# Patient Record
Sex: MALE | Race: WHITE | Employment: OTHER | URBAN - METROPOLITAN AREA
[De-identification: names, ages, dates, MRNs, and addresses within clinical notes are randomized per-mention and may not be internally consistent; named-entity substitution may affect disease eponyms.]

---

## 2018-04-29 ENCOUNTER — HOSPITAL ENCOUNTER (EMERGENCY)
Age: 53
Discharge: HOME | End: 2018-04-29

## 2018-04-29 ENCOUNTER — APPOINTMENT (OUTPATIENT)
Dept: ULTRASOUND IMAGING | Age: 53
End: 2018-04-29

## 2018-04-29 ENCOUNTER — APPOINTMENT (OUTPATIENT)
Dept: CT IMAGING | Age: 53
End: 2018-04-29

## 2018-04-29 VITALS
DIASTOLIC BLOOD PRESSURE: 70 MMHG | HEART RATE: 72 BPM | WEIGHT: 225 LBS | RESPIRATION RATE: 18 BRPM | SYSTOLIC BLOOD PRESSURE: 125 MMHG | OXYGEN SATURATION: 99 % | TEMPERATURE: 98.7 F | BODY MASS INDEX: 32.21 KG/M2 | HEIGHT: 70 IN

## 2018-04-29 DIAGNOSIS — R10.11 COLICKY RUQ ABDOMINAL PAIN: Primary | ICD-10-CM

## 2018-04-29 LAB
ALBUMIN SERPL-MCNC: 4.5 G/DL (ref 3.5–5.2)
ALP BLD-CCNC: 60 U/L (ref 40–129)
ALT SERPL-CCNC: 35 U/L (ref 0–40)
ANION GAP SERPL CALCULATED.3IONS-SCNC: 13 MMOL/L (ref 7–16)
AST SERPL-CCNC: 21 U/L (ref 0–39)
BILIRUB SERPL-MCNC: 0.4 MG/DL (ref 0–1.2)
BILIRUBIN URINE: NEGATIVE
BLOOD, URINE: NEGATIVE
BUN BLDV-MCNC: 14 MG/DL (ref 6–20)
CALCIUM SERPL-MCNC: 9.4 MG/DL (ref 8.6–10.2)
CHLORIDE BLD-SCNC: 99 MMOL/L (ref 98–107)
CLARITY: CLEAR
CO2: 26 MMOL/L (ref 22–29)
COLOR: YELLOW
CREAT SERPL-MCNC: 1 MG/DL (ref 0.7–1.2)
GFR AFRICAN AMERICAN: >60
GFR NON-AFRICAN AMERICAN: >60 ML/MIN/1.73
GLUCOSE BLD-MCNC: 105 MG/DL (ref 74–109)
GLUCOSE URINE: NEGATIVE MG/DL
HCT VFR BLD CALC: 43 % (ref 37–54)
HEMOGLOBIN: 14.3 G/DL (ref 12.5–16.5)
KETONES, URINE: NEGATIVE MG/DL
LACTIC ACID: 1.1 MMOL/L (ref 0.5–2.2)
LEUKOCYTE ESTERASE, URINE: NEGATIVE
LIPASE: 27 U/L (ref 13–60)
MCH RBC QN AUTO: 29.4 PG (ref 26–35)
MCHC RBC AUTO-ENTMCNC: 33.3 % (ref 32–34.5)
MCV RBC AUTO: 88.3 FL (ref 80–99.9)
NITRITE, URINE: NEGATIVE
PDW BLD-RTO: 13.2 FL (ref 11.5–15)
PH UA: 5.5 (ref 5–9)
PLATELET # BLD: 244 E9/L (ref 130–450)
PMV BLD AUTO: 10.1 FL (ref 7–12)
POTASSIUM SERPL-SCNC: 4.4 MMOL/L (ref 3.5–5)
PROTEIN UA: NEGATIVE MG/DL
RBC # BLD: 4.87 E12/L (ref 3.8–5.8)
SODIUM BLD-SCNC: 138 MMOL/L (ref 132–146)
SPECIFIC GRAVITY UA: 1.01 (ref 1–1.03)
TOTAL PROTEIN: 7.4 G/DL (ref 6.4–8.3)
UROBILINOGEN, URINE: 0.2 E.U./DL
WBC # BLD: 10.1 E9/L (ref 4.5–11.5)

## 2018-04-29 PROCEDURE — 81003 URINALYSIS AUTO W/O SCOPE: CPT

## 2018-04-29 PROCEDURE — 83690 ASSAY OF LIPASE: CPT

## 2018-04-29 PROCEDURE — 76705 ECHO EXAM OF ABDOMEN: CPT

## 2018-04-29 PROCEDURE — 83605 ASSAY OF LACTIC ACID: CPT

## 2018-04-29 PROCEDURE — 36415 COLL VENOUS BLD VENIPUNCTURE: CPT

## 2018-04-29 PROCEDURE — 74176 CT ABD & PELVIS W/O CONTRAST: CPT

## 2018-04-29 PROCEDURE — 85027 COMPLETE CBC AUTOMATED: CPT

## 2018-04-29 PROCEDURE — 80053 COMPREHEN METABOLIC PANEL: CPT

## 2018-04-29 PROCEDURE — 99284 EMERGENCY DEPT VISIT MOD MDM: CPT

## 2018-04-29 RX ORDER — DICYCLOMINE HYDROCHLORIDE 10 MG/1
20 CAPSULE ORAL
Qty: 15 CAPSULE | Refills: 0 | Status: SHIPPED | OUTPATIENT
Start: 2018-04-29 | End: 2020-02-06

## 2018-04-29 ASSESSMENT — PAIN SCALES - GENERAL: PAINLEVEL_OUTOF10: 10

## 2018-04-29 ASSESSMENT — PAIN DESCRIPTION - LOCATION: LOCATION: ABDOMEN

## 2018-04-29 ASSESSMENT — PAIN DESCRIPTION - DESCRIPTORS: DESCRIPTORS: ACHING

## 2018-04-29 ASSESSMENT — PAIN DESCRIPTION - ORIENTATION: ORIENTATION: RIGHT;UPPER

## 2020-02-06 ENCOUNTER — HOSPITAL ENCOUNTER (OUTPATIENT)
Age: 55
Setting detail: OBSERVATION
Discharge: HOME | End: 2020-02-07
Attending: EMERGENCY MEDICINE | Admitting: INTERNAL MEDICINE
Payer: COMMERCIAL

## 2020-02-06 ENCOUNTER — APPOINTMENT (OUTPATIENT)
Dept: CT IMAGING | Age: 55
End: 2020-02-06
Payer: COMMERCIAL

## 2020-02-06 ENCOUNTER — APPOINTMENT (OUTPATIENT)
Dept: GENERAL RADIOLOGY | Age: 55
End: 2020-02-06
Payer: COMMERCIAL

## 2020-02-06 ENCOUNTER — APPOINTMENT (OUTPATIENT)
Dept: ULTRASOUND IMAGING | Age: 55
End: 2020-02-06
Payer: COMMERCIAL

## 2020-02-06 PROBLEM — R10.11 RIGHT UPPER QUADRANT ABDOMINAL PAIN: Status: ACTIVE | Noted: 2020-02-06

## 2020-02-06 LAB
ALBUMIN SERPL-MCNC: 4.1 G/DL (ref 3.5–5.2)
ALP BLD-CCNC: 63 U/L (ref 40–129)
ALT SERPL-CCNC: 25 U/L (ref 0–40)
ANION GAP SERPL CALCULATED.3IONS-SCNC: 8 MMOL/L (ref 7–16)
AST SERPL-CCNC: 20 U/L (ref 0–39)
BILIRUB SERPL-MCNC: 0.3 MG/DL (ref 0–1.2)
BILIRUBIN URINE: NEGATIVE
BLOOD, URINE: NEGATIVE
BUN BLDV-MCNC: 18 MG/DL (ref 6–20)
CALCIUM SERPL-MCNC: 9.1 MG/DL (ref 8.6–10.2)
CEA: 2.4 NG/ML (ref 0–5.2)
CHLORIDE BLD-SCNC: 101 MMOL/L (ref 98–107)
CLARITY: CLEAR
CO2: 25 MMOL/L (ref 22–29)
COLOR: YELLOW
CREAT SERPL-MCNC: 1.1 MG/DL (ref 0.7–1.2)
FOLATE: 8.7 NG/ML (ref 4.8–24.2)
GFR AFRICAN AMERICAN: >60
GFR NON-AFRICAN AMERICAN: >60 ML/MIN/1.73
GLUCOSE BLD-MCNC: 119 MG/DL (ref 74–99)
GLUCOSE URINE: NEGATIVE MG/DL
HBA1C MFR BLD: 6.4 % (ref 4–5.6)
HCT VFR BLD CALC: 44.7 % (ref 37–54)
HEMOGLOBIN: 15.2 G/DL (ref 12.5–16.5)
INR BLD: 0.9
KETONES, URINE: NEGATIVE MG/DL
LACTIC ACID, SEPSIS: 0.7 MMOL/L (ref 0.5–1.9)
LACTIC ACID: 0.9 MMOL/L (ref 0.5–2.2)
LEUKOCYTE ESTERASE, URINE: NEGATIVE
LIPASE: 27 U/L (ref 13–60)
MCH RBC QN AUTO: 30.4 PG (ref 26–35)
MCHC RBC AUTO-ENTMCNC: 34 % (ref 32–34.5)
MCV RBC AUTO: 89.4 FL (ref 80–99.9)
NITRITE, URINE: NEGATIVE
PDW BLD-RTO: 13.2 FL (ref 11.5–15)
PH UA: 5.5 (ref 5–9)
PLATELET # BLD: 291 E9/L (ref 130–450)
PMV BLD AUTO: 11.2 FL (ref 7–12)
POTASSIUM SERPL-SCNC: 4.1 MMOL/L (ref 3.5–5)
PRO-BNP: <5 PG/ML (ref 0–125)
PROCALCITONIN: 0.07 NG/ML (ref 0–0.08)
PROTEIN UA: NEGATIVE MG/DL
PROTHROMBIN TIME: 9.8 SEC (ref 9.3–12.4)
RBC # BLD: 5 E12/L (ref 3.8–5.8)
REASON FOR REJECTION: NORMAL
REJECTED TEST: NORMAL
SEDIMENTATION RATE, ERYTHROCYTE: 4 MM/HR (ref 0–15)
SODIUM BLD-SCNC: 134 MMOL/L (ref 132–146)
SPECIFIC GRAVITY UA: >=1.03 (ref 1–1.03)
TOTAL PROTEIN: 6.9 G/DL (ref 6.4–8.3)
TROPONIN: <0.01 NG/ML (ref 0–0.03)
UROBILINOGEN, URINE: 0.2 E.U./DL
VITAMIN B-12: 311 PG/ML (ref 211–946)
WBC # BLD: 8.5 E9/L (ref 4.5–11.5)

## 2020-02-06 PROCEDURE — 82378 CARCINOEMBRYONIC ANTIGEN: CPT

## 2020-02-06 PROCEDURE — 76705 ECHO EXAM OF ABDOMEN: CPT

## 2020-02-06 PROCEDURE — 36415 COLL VENOUS BLD VENIPUNCTURE: CPT

## 2020-02-06 PROCEDURE — 87088 URINE BACTERIA CULTURE: CPT

## 2020-02-06 PROCEDURE — 6370000000 HC RX 637 (ALT 250 FOR IP): Performed by: STUDENT IN AN ORGANIZED HEALTH CARE EDUCATION/TRAINING PROGRAM

## 2020-02-06 PROCEDURE — 85027 COMPLETE CBC AUTOMATED: CPT

## 2020-02-06 PROCEDURE — 84145 PROCALCITONIN (PCT): CPT

## 2020-02-06 PROCEDURE — 2580000003 HC RX 258: Performed by: INTERNAL MEDICINE

## 2020-02-06 PROCEDURE — 6370000000 HC RX 637 (ALT 250 FOR IP): Performed by: INTERNAL MEDICINE

## 2020-02-06 PROCEDURE — 83690 ASSAY OF LIPASE: CPT

## 2020-02-06 PROCEDURE — 83880 ASSAY OF NATRIURETIC PEPTIDE: CPT

## 2020-02-06 PROCEDURE — 71046 X-RAY EXAM CHEST 2 VIEWS: CPT

## 2020-02-06 PROCEDURE — 83605 ASSAY OF LACTIC ACID: CPT

## 2020-02-06 PROCEDURE — 82105 ALPHA-FETOPROTEIN SERUM: CPT

## 2020-02-06 PROCEDURE — 96375 TX/PRO/DX INJ NEW DRUG ADDON: CPT

## 2020-02-06 PROCEDURE — 6360000002 HC RX W HCPCS: Performed by: EMERGENCY MEDICINE

## 2020-02-06 PROCEDURE — 86301 IMMUNOASSAY TUMOR CA 19-9: CPT

## 2020-02-06 PROCEDURE — 82746 ASSAY OF FOLIC ACID SERUM: CPT

## 2020-02-06 PROCEDURE — 85610 PROTHROMBIN TIME: CPT

## 2020-02-06 PROCEDURE — G0378 HOSPITAL OBSERVATION PER HR: HCPCS

## 2020-02-06 PROCEDURE — 83036 HEMOGLOBIN GLYCOSYLATED A1C: CPT

## 2020-02-06 PROCEDURE — 80053 COMPREHEN METABOLIC PANEL: CPT

## 2020-02-06 PROCEDURE — 6360000002 HC RX W HCPCS: Performed by: INTERNAL MEDICINE

## 2020-02-06 PROCEDURE — 96374 THER/PROPH/DIAG INJ IV PUSH: CPT

## 2020-02-06 PROCEDURE — 74176 CT ABD & PELVIS W/O CONTRAST: CPT

## 2020-02-06 PROCEDURE — 96376 TX/PRO/DX INJ SAME DRUG ADON: CPT

## 2020-02-06 PROCEDURE — 99285 EMERGENCY DEPT VISIT HI MDM: CPT

## 2020-02-06 PROCEDURE — 84484 ASSAY OF TROPONIN QUANT: CPT

## 2020-02-06 PROCEDURE — 85651 RBC SED RATE NONAUTOMATED: CPT

## 2020-02-06 PROCEDURE — 87040 BLOOD CULTURE FOR BACTERIA: CPT

## 2020-02-06 PROCEDURE — 82607 VITAMIN B-12: CPT

## 2020-02-06 PROCEDURE — 81003 URINALYSIS AUTO W/O SCOPE: CPT

## 2020-02-06 RX ORDER — MORPHINE SULFATE 4 MG/ML
4 INJECTION, SOLUTION INTRAMUSCULAR; INTRAVENOUS ONCE
Status: COMPLETED | OUTPATIENT
Start: 2020-02-06 | End: 2020-02-06

## 2020-02-06 RX ORDER — SENNA PLUS 8.6 MG/1
1 TABLET ORAL NIGHTLY
Status: DISCONTINUED | OUTPATIENT
Start: 2020-02-06 | End: 2020-02-07 | Stop reason: HOSPADM

## 2020-02-06 RX ORDER — SIMETHICONE 80 MG
80 TABLET,CHEWABLE ORAL EVERY 6 HOURS PRN
COMMUNITY

## 2020-02-06 RX ORDER — KETOROLAC TROMETHAMINE 30 MG/ML
15 INJECTION, SOLUTION INTRAMUSCULAR; INTRAVENOUS ONCE
Status: COMPLETED | OUTPATIENT
Start: 2020-02-06 | End: 2020-02-06

## 2020-02-06 RX ORDER — IBUPROFEN 200 MG
200 TABLET ORAL EVERY 6 HOURS PRN
COMMUNITY

## 2020-02-06 RX ORDER — PANTOPRAZOLE SODIUM 40 MG/1
40 TABLET, DELAYED RELEASE ORAL
Status: DISCONTINUED | OUTPATIENT
Start: 2020-02-07 | End: 2020-02-07 | Stop reason: HOSPADM

## 2020-02-06 RX ORDER — ACETAMINOPHEN 325 MG/1
650 TABLET ORAL EVERY 4 HOURS PRN
Status: DISCONTINUED | OUTPATIENT
Start: 2020-02-06 | End: 2020-02-07 | Stop reason: HOSPADM

## 2020-02-06 RX ORDER — MORPHINE SULFATE 4 MG/ML
6 INJECTION, SOLUTION INTRAMUSCULAR; INTRAVENOUS ONCE
Status: COMPLETED | OUTPATIENT
Start: 2020-02-06 | End: 2020-02-06

## 2020-02-06 RX ORDER — SODIUM CHLORIDE 9 MG/ML
INJECTION, SOLUTION INTRAVENOUS CONTINUOUS
Status: DISCONTINUED | OUTPATIENT
Start: 2020-02-06 | End: 2020-02-07 | Stop reason: HOSPADM

## 2020-02-06 RX ORDER — DOCUSATE SODIUM 100 MG/1
200 CAPSULE, LIQUID FILLED ORAL 2 TIMES DAILY
Status: DISCONTINUED | OUTPATIENT
Start: 2020-02-06 | End: 2020-02-07 | Stop reason: HOSPADM

## 2020-02-06 RX ORDER — KETOROLAC TROMETHAMINE 30 MG/ML
15 INJECTION, SOLUTION INTRAMUSCULAR; INTRAVENOUS EVERY 6 HOURS PRN
Status: DISPENSED | OUTPATIENT
Start: 2020-02-06 | End: 2020-02-07

## 2020-02-06 RX ORDER — DICYCLOMINE HYDROCHLORIDE 10 MG/1
20 CAPSULE ORAL
Status: DISCONTINUED | OUTPATIENT
Start: 2020-02-06 | End: 2020-02-06

## 2020-02-06 RX ORDER — DOCUSATE SODIUM 100 MG/1
100 CAPSULE, LIQUID FILLED ORAL 2 TIMES DAILY
Status: DISCONTINUED | OUTPATIENT
Start: 2020-02-06 | End: 2020-02-06

## 2020-02-06 RX ORDER — HYDROCODONE BITARTRATE AND ACETAMINOPHEN 5; 325 MG/1; MG/1
1 TABLET ORAL EVERY 4 HOURS PRN
Status: DISCONTINUED | OUTPATIENT
Start: 2020-02-06 | End: 2020-02-07 | Stop reason: HOSPADM

## 2020-02-06 RX ADMIN — MAGNESIUM CITRATE 296 ML: 1.75 LIQUID ORAL at 15:17

## 2020-02-06 RX ADMIN — SODIUM CHLORIDE: 9 INJECTION, SOLUTION INTRAVENOUS at 13:58

## 2020-02-06 RX ADMIN — SENNOSIDES 8.6 MG: 8.6 TABLET, FILM COATED ORAL at 20:41

## 2020-02-06 RX ADMIN — DOCUSATE SODIUM 200 MG: 100 CAPSULE, LIQUID FILLED ORAL at 20:41

## 2020-02-06 RX ADMIN — HYDROCODONE BITARTRATE AND ACETAMINOPHEN 1 TABLET: 5; 325 TABLET ORAL at 20:41

## 2020-02-06 RX ADMIN — MORPHINE SULFATE 6 MG: 4 INJECTION, SOLUTION INTRAMUSCULAR; INTRAVENOUS at 11:52

## 2020-02-06 RX ADMIN — SODIUM CHLORIDE: 9 INJECTION, SOLUTION INTRAVENOUS at 23:11

## 2020-02-06 RX ADMIN — KETOROLAC TROMETHAMINE 15 MG: 30 INJECTION, SOLUTION INTRAMUSCULAR; INTRAVENOUS at 15:17

## 2020-02-06 RX ADMIN — MORPHINE SULFATE 4 MG: 4 INJECTION, SOLUTION INTRAMUSCULAR; INTRAVENOUS at 08:34

## 2020-02-06 RX ADMIN — HYDROCODONE BITARTRATE AND ACETAMINOPHEN 1 TABLET: 5; 325 TABLET ORAL at 16:20

## 2020-02-06 RX ADMIN — KETOROLAC TROMETHAMINE 15 MG: 30 INJECTION, SOLUTION INTRAMUSCULAR; INTRAVENOUS at 22:06

## 2020-02-06 RX ADMIN — KETOROLAC TROMETHAMINE 15 MG: 30 INJECTION, SOLUTION INTRAMUSCULAR; INTRAVENOUS at 10:36

## 2020-02-06 ASSESSMENT — PAIN DESCRIPTION - PROGRESSION
CLINICAL_PROGRESSION: NOT CHANGED
CLINICAL_PROGRESSION: NOT CHANGED

## 2020-02-06 ASSESSMENT — PAIN - FUNCTIONAL ASSESSMENT
PAIN_FUNCTIONAL_ASSESSMENT: ACTIVITIES ARE NOT PREVENTED
PAIN_FUNCTIONAL_ASSESSMENT: PREVENTS OR INTERFERES SOME ACTIVE ACTIVITIES AND ADLS

## 2020-02-06 ASSESSMENT — PAIN DESCRIPTION - ORIENTATION
ORIENTATION: RIGHT

## 2020-02-06 ASSESSMENT — PAIN SCALES - GENERAL
PAINLEVEL_OUTOF10: 9
PAINLEVEL_OUTOF10: 7
PAINLEVEL_OUTOF10: 5
PAINLEVEL_OUTOF10: 8
PAINLEVEL_OUTOF10: 6
PAINLEVEL_OUTOF10: 8
PAINLEVEL_OUTOF10: 7
PAINLEVEL_OUTOF10: 8
PAINLEVEL_OUTOF10: 7

## 2020-02-06 ASSESSMENT — PAIN DESCRIPTION - DESCRIPTORS
DESCRIPTORS: SHARP;THROBBING
DESCRIPTORS: CONSTANT;CRAMPING;THROBBING
DESCRIPTORS: DULL
DESCRIPTORS: CONSTANT;CRAMPING;THROBBING

## 2020-02-06 ASSESSMENT — PAIN DESCRIPTION - ONSET
ONSET: ON-GOING
ONSET: ON-GOING

## 2020-02-06 ASSESSMENT — PAIN DESCRIPTION - FREQUENCY
FREQUENCY: CONTINUOUS

## 2020-02-06 ASSESSMENT — PAIN DESCRIPTION - PAIN TYPE
TYPE: ACUTE PAIN

## 2020-02-06 ASSESSMENT — PAIN DESCRIPTION - LOCATION
LOCATION: ABDOMEN

## 2020-02-06 NOTE — ED NOTES
Admission nurse entered room just before this RN and woke pt up from sleeping, family at bedside. Pt evaluated for pain level.      Andrzej Rivero RN  02/06/20 [id number]

## 2020-02-06 NOTE — H&P
finding  frequently related to fatty infiltration. There is limited visualization of the pancreas with no clearly  pathologic findings present. Right kidney is only partially visualized but not clearly pathologic. No evidence of pleural effusion or ascites.      Impression:       Unremarkable biliary tract. The liver appears mildly  hyperechoic which is a nonspecific finding frequently related to fatty  infiltration. Chest x-ray in the ED was negative. Liver functions were all normal.  Glucose 119. Lactic acid 0.7 sodium 134 potassium 4.1 chloride 101 CO2 25 BUN 18 creatinine 1.1 calcium 9.1 protein 6.9 albumin 4.1.  --Patient denies any previous rheumatic fever scarlet fever polio diphtheria cancer diabetes hypertension  --Colonoscopy last year; previous umbilical hernia repair no other surgical procedures  --Patient continues smoking despite all warnings, half a pack a day began approximately age 12  --States he has had pneumonia twice in the past.  Denies any recurring shortness of breath bronchitis asthma  --Denies any previous chest pains heart murmurs cardiac problems  --Denies any previous kidney stones blood in the stool blood in the urine        Past Medical History:   Diagnosis Date    Abdominal pain, RUQ (right upper quadrant) 2010    Diverticulosis     H/O: pneumonia     X2         Past Surgical History:   Procedure Laterality Date    COLONOSCOPY  2019    Diverticulosis    HERNIA REPAIR  1912    Umbilical hernia repair       Medications Prior to Admission:    Medications Prior to Admission: simethicone (MYLICON) 80 MG chewable tablet, Take 80 mg by mouth every 6 hours as needed for Flatulence  ibuprofen (ADVIL;MOTRIN) 200 MG tablet, Take 200 mg by mouth every 6 hours as needed for Pain    Allergies:    Iodine    Social History:    reports that he has been smoking cigarettes. He started smoking about 40 years ago. He has a 20.00 pack-year smoking history.  He has never used smokeless tobacco. He reports current alcohol use of about 42.0 standard drinks of alcohol per week. He reports current drug use. Drug: Marijuana. Family History:   family history includes Breast Cancer in his mother; Cancer in his brother and sister; Diabetes in his brother; Max Sharp in his father; Mult Sclerosis in his sister. REVIEW OF SYSTEMS:  As above in the HPI, otherwise negative    PHYSICAL EXAM:    VS: /75   Pulse 71   Temp 97.7 °F (36.5 °C) (Oral)   Resp 18   Ht 5' 10.5\" (1.791 m)   Wt 237 lb (107.5 kg)   SpO2 95%   BMI 33.53 kg/m²     General appearance: Alert, Awake, Oriented times 3, no distress  Skin: Warm and dry ; no rashes  Head: Normocephalic. No masses, lesions or tenderness noted  Eyes: Conjunctivae pink, sclera white. PERRL,EOM-I  Ears: External ears normal  Nose/Sinuses: Nares normal. Septum midline. Mucosa normal. No drainage  Oropharynx: Oropharynx clear with no exudate seen  Neck: Supple. No jugular venous distension, lymphadenopathy or thyromegaly Trachea midline  Lungs: Clear to auscultation bilaterally. No rhonchi, crackles or wheezes  Heart: S1 S2  Regular rate and rhythm. No rub, murmur or gallop  Abdomen: Soft, equivocal tenderness right upper quadrant to palpation. BS normal. No masses, organomegaly; no rebound or guarding  Extremities: No edema, Peripheral pulses palpable  Musculoskeletal: Muscular strength appears intact. Neuro:  No focal motor defects ; II-XII grossly intact .  RIVERA equally  Breast: deferred  Rectal: deferred  Genitalia:  deferred    LABS:  CBC:   Lab Results   Component Value Date    WBC 8.5 02/06/2020    RBC 5.00 02/06/2020    HGB 15.2 02/06/2020    HCT 44.7 02/06/2020    MCV 89.4 02/06/2020    MCH 30.4 02/06/2020    MCHC 34.0 02/06/2020    RDW 13.2 02/06/2020     02/06/2020    MPV 11.2 02/06/2020     CBC with Differential:    Lab Results   Component Value Date    WBC 8.5 02/06/2020    RBC 5.00 02/06/2020    HGB 15.2 02/06/2020    HCT 44.7 02/06/2020 0.2 02/06/2020    BILIRUBINUR Negative 02/06/2020    BLOODU Negative 02/06/2020    GLUCOSEU Negative 02/06/2020     HgBA1c:  No results found for: LABA1C  FLP:  No results found for: TRIG, HDL, LDLCALC, LDLDIRECT, LABVLDL  TSH:  No results found for: TSH  VITAMIN B12: No components found for: B12  FOLATE:  No results found for: FOLATE  LIPASE:    Lab Results   Component Value Date    LIPASE 27 02/06/2020       RADIOLOGY:  US ABDOMEN LIMITED   Final Result   Unremarkable biliary tract. The liver appears mildly   hyperechoic which is a nonspecific finding frequently related to fatty   infiltration. XR CHEST STANDARD (2 VW)   Final Result   No acute cardiopulmonary findings. CT ABDOMEN PELVIS WO CONTRAST   Final Result   Normal appendix. Colonic diverticulosis. Small fat-containing right   inguinal hernia. ASSESSMENT:      Active Hospital Problems    Diagnosis    Right upper quadrant abdominal pain [R10.11]       PLAN:  Medications discussed with patient  GI prophylaxis  DVT prophylaxis  I am told the ED physician discussed the above with Dr. Rea Arizmendi; Dr. Rea Arizmendi preferred the patient be admitted for observation and surgical consult. ED then contacted surgical, Dr. Al Kaufman. I spoke with surgical resident after he examined the patient; he believes patient may simply have constipation difficulties.   Citrate of magnesia has been ordered as well as Colace  Patient and wife informed me he has not been taking Bentyl despite it being listed on his intake medications; it will be discontinued  Ketorolac for severe pain which he states he is experiencing now  Since last year his work-up including HIDA scan were all done when patient was asymptomatic, I will repeat the HIDA scan at this time since he is having \"symptoms\" that awakened him from sleep  Await surgical consult          Please note that over 50 minutes was spent in evaluating the patient, review of records and results, discussion with staff/family, etc.    Wiliam Anthony  DO  2:54 PM  2/6/2020    Voice recognition software use for dictation

## 2020-02-06 NOTE — CONSULTS
GENERAL SURGERY  CONSULT NOTE  2/6/2020    Physician Consulted: Dr. Joshua Morgan  Reason for Consult: RUQ abd pain      HPI  Susu Perkins is a 47 y.o. male who presents for evaluation of weeks of intermittent RUQ abd pain that has progressively worsened. The pain radiates to his right lower back. The pain is not related to diet or bowel function. He states he has had this problem for about 10 years and when the pain starts it is intermittent for weeks before it resolves. He has had multiple CTs, RUQ US, and HIDA that did not show any gallbladder pathology. He had a colonoscopy last year which showed pandiverticular disease. He denies any Hx of EGD. Past Medical History:   Diagnosis Date    Diverticulosis        Past Surgical History:   Procedure Laterality Date    HERNIA REPAIR         Medications Prior to Admission:    Prior to Admission medications    Medication Sig Start Date End Date Taking? Authorizing Provider   simethicone (MYLICON) 80 MG chewable tablet Take 80 mg by mouth every 6 hours as needed for Flatulence   Yes Historical Provider, MD   ibuprofen (ADVIL;MOTRIN) 200 MG tablet Take 200 mg by mouth every 6 hours as needed for Pain   Yes Historical Provider, MD       Allergies   Allergen Reactions    Iodine Rash       Family History   Problem Relation Age of Onset    Breast Cancer Mother    Constantine Cheatham Father     Mult Sclerosis Sister     Diabetes Brother     Cancer Brother         leukemia    Cancer Sister         skin       Social History     Tobacco Use    Smoking status: Current Some Day Smoker     Packs/day: 0.50     Types: Cigarettes     Start date: 2/6/1986    Smokeless tobacco: Never Used   Substance Use Topics    Alcohol use:  Yes     Alcohol/week: 42.0 standard drinks     Types: 42 Cans of beer per week     Comment: 6 beers nightly    Drug use: Yes     Types: Marijuana         Review of Systems   General ROS: fever (-), chills (-)  Respiratory ROS: SOB (-)  Cardiovascular ROS: CP (-)  Gastrointestinal ROS: Nausea (-), vomiting (-), diarrhea (-), constipation (-), melena (-), hematochezia (-)        PHYSICAL EXAM:    Vitals:    20 1327   BP: 121/75   Pulse: 71   Resp: 18   Temp: 97.7 °F (36.5 °C)   SpO2: 95%       General Appearance:  Alert, awake, cooperative, lying in bed   Lungs:  Normal work of breathing   Heart:  Regular rate   Abdomen:  Soft, no tenderness, non distended       LABS:  CBC  Recent Labs     20  0816   WBC 8.5   HGB 15.2   HCT 44.7        BMP  Recent Labs     20  1100      K 4.1      CO2 25   BUN 18   CREATININE 1.1   CALCIUM 9.1     Liver Function  Recent Labs     20  0816 20  1100   LIPASE 27  --    BILITOT  --  0.3   AST  --  20   ALT  --  25   ALKPHOS  --  63   PROT  --  6.9   LABALBU  --  4.1     No results for input(s): LACTATE in the last 72 hours. Recent Labs     20  1337   INR 0.9       RADIOLOGY  Ct Abdomen Pelvis Wo Contrast    Result Date: 2020  Patient MRN:  [id number] : 1965 Age: 47 years Gender: Male Order Date:  2020 8:15 AM EXAM: CT ABDOMEN PELVIS WO CONTRAST NUMBER OF IMAGES \ views:  403 INDICATION: Intermittent right lower quadrant abdominal pain x2 weeks. pain COMPARISON: 2018 TECHNIQUE: Axial computerized tomography sections of the abdomen and pelvis with sagittal and coronal MPR reconstructions were obtained from the top of the diaphragm to the pelvis. Low-dose CT  acquisition technique included one of following options; 1 . Automated exposure control, 2. Adjustment of MA and or KV according to patient's size or 3. Use of iterative reconstruction. FINDINGS: THORACIC BASE: Unremarkable. LIVER: Unremarkable. BILIARY: The gallbladder and bile ducts are unremarkable. PANCREAS: Unremarkable. SPLEEN: Unremarkable. ADRENALS:  Unremarkable. KIDNEYS:  Unremarkable. GI: No evidence of free air or bowel obstruction. The appendix is normal. There is colonic diverticulosis.  PELVIS: There is a small fat-containing right inguinal hernia. MSK: No acute osseous findings. OTHER: None. Normal appendix. Colonic diverticulosis. Small fat-containing right inguinal hernia. Xr Chest Standard (2 Vw)    Result Date: 2020  Patient MRN: [id number] : 1965 Age:  47 years Gender: Male Order Date: 2020 8:15 AM Exam: XR CHEST (2 VW) Number of Images: 2 view Indication:   RUQ pain RUQ pain Comparison: 15 December 2009 FINDINGS: Heart and pulmonary vascularity normal. Lungs clear. Costophrenic angles sharp. Normal aorta. No acute cardiopulmonary findings. Us Abdomen Limited    Result Date: 2020  Patient MRN:  [id number] : 1965 Age: 47 years Gender: Male Order Date:  2020 8:15 AM EXAM: US ABDOMEN LIMITED NUMBER OF IMAGES:  58 INDICATION:  Acute right upper quadrant Pain Pain COMPARISON: CT abdomen and pelvis 2020, 0857 hours. Technique: Sonographic interrogation abdomen attention right upper quadrant per standard protocol. FINDINGS: Gallbladder wall normal thickness with no pericholecystic fluid or shadowing stones. No intra or extrahepatic biliary dilatation present. No elicitable sonographic Reich's sign. Liver: Appears mildly hyperechoic. This is a nonspecific finding frequently related to fatty infiltration. There is limited visualization of the pancreas with no clearly pathologic findings present. Right kidney is only partially visualized but not clearly pathologic. No evidence of pleural effusion or ascites. Unremarkable biliary tract. The liver appears mildly hyperechoic which is a nonspecific finding frequently related to fatty infiltration.          ASSESSMENT:  47 y.o. male with RUQ pain and constipation    PLAN:  LUCA  Daily bowel regimen  Mag citrate  May benefit from EGD  Will discuss with attending    Electronically signed by Bharat Myers MD on 20 at 2:32 PM

## 2020-02-07 ENCOUNTER — ANESTHESIA EVENT (OUTPATIENT)
Dept: ENDOSCOPY | Age: 55
End: 2020-02-07
Payer: COMMERCIAL

## 2020-02-07 ENCOUNTER — APPOINTMENT (OUTPATIENT)
Dept: NUCLEAR MEDICINE | Age: 55
End: 2020-02-07
Payer: COMMERCIAL

## 2020-02-07 ENCOUNTER — ANESTHESIA (OUTPATIENT)
Dept: ENDOSCOPY | Age: 55
End: 2020-02-07
Payer: COMMERCIAL

## 2020-02-07 VITALS
DIASTOLIC BLOOD PRESSURE: 67 MMHG | RESPIRATION RATE: 22 BRPM | OXYGEN SATURATION: 97 % | SYSTOLIC BLOOD PRESSURE: 113 MMHG

## 2020-02-07 VITALS
TEMPERATURE: 98.6 F | SYSTOLIC BLOOD PRESSURE: 165 MMHG | BODY MASS INDEX: 32.98 KG/M2 | WEIGHT: 235.6 LBS | HEART RATE: 68 BPM | HEIGHT: 71 IN | RESPIRATION RATE: 18 BRPM | OXYGEN SATURATION: 95 % | DIASTOLIC BLOOD PRESSURE: 94 MMHG

## 2020-02-07 LAB
ALBUMIN SERPL-MCNC: 3.6 G/DL (ref 3.5–5.2)
ALP BLD-CCNC: 49 U/L (ref 40–129)
ALT SERPL-CCNC: 26 U/L (ref 0–40)
ANION GAP SERPL CALCULATED.3IONS-SCNC: 7 MMOL/L (ref 7–16)
AST SERPL-CCNC: 22 U/L (ref 0–39)
BASOPHILS ABSOLUTE: 0.05 E9/L (ref 0–0.2)
BASOPHILS RELATIVE PERCENT: 0.6 % (ref 0–2)
BILIRUB SERPL-MCNC: 0.5 MG/DL (ref 0–1.2)
BILIRUBIN DIRECT: <0.2 MG/DL (ref 0–0.3)
BILIRUBIN, INDIRECT: ABNORMAL MG/DL (ref 0–1)
BUN BLDV-MCNC: 20 MG/DL (ref 6–20)
CALCIUM SERPL-MCNC: 8.3 MG/DL (ref 8.6–10.2)
CHLORIDE BLD-SCNC: 104 MMOL/L (ref 98–107)
CHOLESTEROL, TOTAL: 204 MG/DL (ref 0–199)
CO2: 24 MMOL/L (ref 22–29)
CREAT SERPL-MCNC: 1 MG/DL (ref 0.7–1.2)
EOSINOPHILS ABSOLUTE: 0.43 E9/L (ref 0.05–0.5)
EOSINOPHILS RELATIVE PERCENT: 5.3 % (ref 0–6)
GFR AFRICAN AMERICAN: >60
GFR NON-AFRICAN AMERICAN: >60 ML/MIN/1.73
GLUCOSE BLD-MCNC: 91 MG/DL (ref 74–99)
HCT VFR BLD CALC: 41.6 % (ref 37–54)
HDLC SERPL-MCNC: 43 MG/DL
HEMOGLOBIN: 13.4 G/DL (ref 12.5–16.5)
IMMATURE GRANULOCYTES #: 0.03 E9/L
IMMATURE GRANULOCYTES %: 0.4 % (ref 0–5)
LDL CHOLESTEROL CALCULATED: 124 MG/DL (ref 0–99)
LYMPHOCYTES ABSOLUTE: 2.63 E9/L (ref 1.5–4)
LYMPHOCYTES RELATIVE PERCENT: 32.7 % (ref 20–42)
MAGNESIUM: 2.5 MG/DL (ref 1.6–2.6)
MCH RBC QN AUTO: 29.1 PG (ref 26–35)
MCHC RBC AUTO-ENTMCNC: 32.2 % (ref 32–34.5)
MCV RBC AUTO: 90.2 FL (ref 80–99.9)
MONOCYTES ABSOLUTE: 0.61 E9/L (ref 0.1–0.95)
MONOCYTES RELATIVE PERCENT: 7.6 % (ref 2–12)
NEUTROPHILS ABSOLUTE: 4.29 E9/L (ref 1.8–7.3)
NEUTROPHILS RELATIVE PERCENT: 53.4 % (ref 43–80)
PDW BLD-RTO: 12.9 FL (ref 11.5–15)
PHOSPHORUS: 3.5 MG/DL (ref 2.5–4.5)
PLATELET # BLD: 198 E9/L (ref 130–450)
PMV BLD AUTO: 10 FL (ref 7–12)
POTASSIUM SERPL-SCNC: 4.1 MMOL/L (ref 3.5–5)
RBC # BLD: 4.61 E12/L (ref 3.8–5.8)
SODIUM BLD-SCNC: 135 MMOL/L (ref 132–146)
TOTAL PROTEIN: 6.2 G/DL (ref 6.4–8.3)
TRIGL SERPL-MCNC: 185 MG/DL (ref 0–149)
TSH SERPL DL<=0.05 MIU/L-ACNC: 3.02 UIU/ML (ref 0.27–4.2)
URIC ACID, SERUM: 4.8 MG/DL (ref 3.4–7)
VLDLC SERPL CALC-MCNC: 37 MG/DL
WBC # BLD: 8 E9/L (ref 4.5–11.5)

## 2020-02-07 PROCEDURE — A9537 TC99M MEBROFENIN: HCPCS | Performed by: RADIOLOGY

## 2020-02-07 PROCEDURE — 6360000002 HC RX W HCPCS: Performed by: NURSE ANESTHETIST, CERTIFIED REGISTERED

## 2020-02-07 PROCEDURE — G0378 HOSPITAL OBSERVATION PER HR: HCPCS

## 2020-02-07 PROCEDURE — 85025 COMPLETE CBC W/AUTO DIFF WBC: CPT

## 2020-02-07 PROCEDURE — 83735 ASSAY OF MAGNESIUM: CPT

## 2020-02-07 PROCEDURE — 7100000011 HC PHASE II RECOVERY - ADDTL 15 MIN: Performed by: SURGERY

## 2020-02-07 PROCEDURE — 6360000002 HC RX W HCPCS: Performed by: INTERNAL MEDICINE

## 2020-02-07 PROCEDURE — 88305 TISSUE EXAM BY PATHOLOGIST: CPT

## 2020-02-07 PROCEDURE — 3700000000 HC ANESTHESIA ATTENDED CARE: Performed by: SURGERY

## 2020-02-07 PROCEDURE — 7100000010 HC PHASE II RECOVERY - FIRST 15 MIN: Performed by: SURGERY

## 2020-02-07 PROCEDURE — 3609012400 HC EGD TRANSORAL BIOPSY SINGLE/MULTIPLE: Performed by: SURGERY

## 2020-02-07 PROCEDURE — 3430000000 HC RX DIAGNOSTIC RADIOPHARMACEUTICAL: Performed by: RADIOLOGY

## 2020-02-07 PROCEDURE — 2580000003 HC RX 258: Performed by: NURSE ANESTHETIST, CERTIFIED REGISTERED

## 2020-02-07 PROCEDURE — 88342 IMHCHEM/IMCYTCHM 1ST ANTB: CPT

## 2020-02-07 PROCEDURE — 96376 TX/PRO/DX INJ SAME DRUG ADON: CPT

## 2020-02-07 PROCEDURE — 80076 HEPATIC FUNCTION PANEL: CPT

## 2020-02-07 PROCEDURE — 2709999900 HC NON-CHARGEABLE SUPPLY: Performed by: SURGERY

## 2020-02-07 PROCEDURE — 6370000000 HC RX 637 (ALT 250 FOR IP): Performed by: INTERNAL MEDICINE

## 2020-02-07 PROCEDURE — 36415 COLL VENOUS BLD VENIPUNCTURE: CPT

## 2020-02-07 PROCEDURE — 2580000003 HC RX 258: Performed by: INTERNAL MEDICINE

## 2020-02-07 PROCEDURE — 84443 ASSAY THYROID STIM HORMONE: CPT

## 2020-02-07 PROCEDURE — 80048 BASIC METABOLIC PNL TOTAL CA: CPT

## 2020-02-07 PROCEDURE — 84550 ASSAY OF BLOOD/URIC ACID: CPT

## 2020-02-07 PROCEDURE — 78226 HEPATOBILIARY SYSTEM IMAGING: CPT

## 2020-02-07 PROCEDURE — 84100 ASSAY OF PHOSPHORUS: CPT

## 2020-02-07 PROCEDURE — 80061 LIPID PANEL: CPT

## 2020-02-07 RX ORDER — SENNA PLUS 8.6 MG/1
1 TABLET ORAL NIGHTLY
Qty: 30 TABLET | Refills: 0 | COMMUNITY
Start: 2020-02-07 | End: 2020-03-08

## 2020-02-07 RX ORDER — PSEUDOEPHEDRINE HCL 30 MG
200 TABLET ORAL 2 TIMES DAILY
COMMUNITY
Start: 2020-02-07

## 2020-02-07 RX ORDER — FENTANYL CITRATE 50 UG/ML
INJECTION, SOLUTION INTRAMUSCULAR; INTRAVENOUS PRN
Status: DISCONTINUED | OUTPATIENT
Start: 2020-02-07 | End: 2020-02-07 | Stop reason: SDUPTHER

## 2020-02-07 RX ORDER — SODIUM CHLORIDE 9 MG/ML
INJECTION, SOLUTION INTRAVENOUS CONTINUOUS PRN
Status: DISCONTINUED | OUTPATIENT
Start: 2020-02-07 | End: 2020-02-07 | Stop reason: SDUPTHER

## 2020-02-07 RX ORDER — PROPOFOL 10 MG/ML
INJECTION, EMULSION INTRAVENOUS PRN
Status: DISCONTINUED | OUTPATIENT
Start: 2020-02-07 | End: 2020-02-07 | Stop reason: SDUPTHER

## 2020-02-07 RX ORDER — HYDROCODONE BITARTRATE AND ACETAMINOPHEN 5; 325 MG/1; MG/1
1 TABLET ORAL EVERY 4 HOURS PRN
Qty: 10 TABLET | Refills: 0 | Status: SHIPPED | OUTPATIENT
Start: 2020-02-07 | End: 2020-02-10

## 2020-02-07 RX ORDER — PANTOPRAZOLE SODIUM 40 MG/1
40 TABLET, DELAYED RELEASE ORAL
Qty: 30 TABLET | Refills: 3 | Status: SHIPPED | OUTPATIENT
Start: 2020-02-08

## 2020-02-07 RX ADMIN — Medication 6 MILLICURIE: at 06:57

## 2020-02-07 RX ADMIN — SODIUM CHLORIDE: 9 INJECTION, SOLUTION INTRAVENOUS at 09:19

## 2020-02-07 RX ADMIN — HYDROCODONE BITARTRATE AND ACETAMINOPHEN 1 TABLET: 5; 325 TABLET ORAL at 09:19

## 2020-02-07 RX ADMIN — KETOROLAC TROMETHAMINE 15 MG: 30 INJECTION, SOLUTION INTRAMUSCULAR; INTRAVENOUS at 05:51

## 2020-02-07 RX ADMIN — PROPOFOL 150 MG: 10 INJECTION, EMULSION INTRAVENOUS at 14:58

## 2020-02-07 RX ADMIN — SODIUM CHLORIDE: 9 INJECTION, SOLUTION INTRAVENOUS at 14:55

## 2020-02-07 RX ADMIN — FENTANYL CITRATE 50 MCG: 50 INJECTION, SOLUTION INTRAMUSCULAR; INTRAVENOUS at 14:56

## 2020-02-07 RX ADMIN — FENTANYL CITRATE 50 MCG: 50 INJECTION, SOLUTION INTRAMUSCULAR; INTRAVENOUS at 15:01

## 2020-02-07 ASSESSMENT — PAIN DESCRIPTION - ORIENTATION: ORIENTATION: RIGHT

## 2020-02-07 ASSESSMENT — PAIN SCALES - GENERAL
PAINLEVEL_OUTOF10: 0
PAINLEVEL_OUTOF10: 0
PAINLEVEL_OUTOF10: 7
PAINLEVEL_OUTOF10: 6
PAINLEVEL_OUTOF10: 0
PAINLEVEL_OUTOF10: 0
PAINLEVEL_OUTOF10: 3

## 2020-02-07 ASSESSMENT — PAIN DESCRIPTION - LOCATION: LOCATION: ABDOMEN

## 2020-02-07 ASSESSMENT — PAIN DESCRIPTION - PROGRESSION: CLINICAL_PROGRESSION: NOT CHANGED

## 2020-02-07 ASSESSMENT — PAIN DESCRIPTION - FREQUENCY: FREQUENCY: INTERMITTENT

## 2020-02-07 ASSESSMENT — LIFESTYLE VARIABLES: SMOKING_STATUS: 1

## 2020-02-07 ASSESSMENT — PAIN DESCRIPTION - ONSET: ONSET: ON-GOING

## 2020-02-07 ASSESSMENT — PAIN DESCRIPTION - DESCRIPTORS: DESCRIPTORS: ACHING

## 2020-02-07 ASSESSMENT — PAIN - FUNCTIONAL ASSESSMENT: PAIN_FUNCTIONAL_ASSESSMENT: ACTIVITIES ARE NOT PREVENTED

## 2020-02-07 ASSESSMENT — PAIN DESCRIPTION - PAIN TYPE: TYPE: ACUTE PAIN

## 2020-02-07 NOTE — CARE COORDINATION
Introduced my self and provided explanation of CM role to patient. Patient is awake, alert, and aware of current diagnosis and treatment plan including hida scan (pending result) and EGD. Patient verbalizes no concerns and identifies no areas to focus on nor barriers to discharge. He states he is independent and will be discharging home. Denies any home going needs. He is established with a pcp and denies any issue with retail pharmaceutical coverage. Will follow along with  and assist with discharge planning as necessary. Explained ELOS of  24 hours; patient voiced understanding and agreement. Liz Rider.  Yomi, MSN, RN  [facility] Case Management  [phone]

## 2020-02-07 NOTE — PLAN OF CARE
Problem: Pain:  Goal: Pain level will decrease  Description  Pain level will decrease     Pain level will decrease  Outcome: Met This Shift     Problem: FALL RISK  Goal: Absence of falls  Outcome: Met This Shift     Problem: Pain:  Goal: Pain level will decrease  Description  Pain level will decrease     Pain level will decrease  Outcome: Met This Shift  Goal: Control of acute pain  Description  Control of acute pain  Outcome: Met This Shift

## 2020-02-07 NOTE — PROGRESS NOTES
PROGRESS NOTE:    Patient seen and examined  Chart reviewed  OR today for EGD    Electronically signed by Niraj Mathur MD on 2/7/2020 at 5:59 AM

## 2020-02-07 NOTE — PROGRESS NOTES
PROGRESS  NOTE --                                                          INTERNAL  MEDICINE                                                                              I  PERSONALLY SAW , EXAMINED, AND CARED FOR  Sandrine Morning TODAY, 2/7/2020     LABS, XRAY ,CHART, AND MEDICATIONS  REVIEWED BY ME .      PATIENT PROBLEM LIST:  Principal Problem:    Right upper quadrant abdominal pain  Active Problems:    Diverticulosis  Resolved Problems:    * No resolved hospital problems. *         2/7/2020-sUBJECTIVE: Sandrine Morning is alert awake and cooperative; oriented ×3. Denies any chest pain dyspnea nausea emesis. Tolerating diet. Still has the same right upper quadrant pain however then states he slept well overnight. Patient did have significant bowel movements does not think there has been a change in the abdominal pain. Wife is present through the exam.  Numerous questions answered at length regarding possible Crohn's disease, IBS, constipation etc.  With normal colonoscopy unlikely to be Crohn's. Patient also concerned regarding pancreas; CT of abdomen shows no abnormalities of pancreas. Temperature 97.7 respirations 18 pulse 65 blood pressure 120/83.  96% saturation on room air. Sodium 135 potassium 4.1 chloride 104 CO2 24 BUN 20 creatinine 1.0 magnesium 2.5 glucose 91 calcium 8.3 phosphorus 3.5 protein 6.2 albumin 3.6 uric acid 4.8 procalcitonin 0.07 . Liver functions normal.  A1c 6.4 TSH 3.020 hemoglobin 13.4 WBC 8.0 platelets 659. Urine culture negative, blood cultures pending. Patient seen by surgical service, scheduled for EGD today. HIDA scan results discussed with patient and wife--      Findings: There is good hepatic uptake of radiopharmaceutical. The  gallbladder is visible at 30 minutes. The bowel is visible at 60  minutes.      Impression:       Patent cystic and common bile ducts.          ROS:  Negative to a 10 system review except that mentioned in the HPI. Objective:     PHYSICAL EXAM:    VS: /83   Pulse 65   Temp 97.7 °F (36.5 °C) (Oral)   Resp 18   Ht 5' 10.5\" (1.791 m)   Wt 235 lb 9.6 oz (106.9 kg)   SpO2 96%   BMI 33.33 kg/m²   General appearance: Alert, Awake, Oriented times 3, no distress  Skin: Warm and dry ; no rashes  Head: Normocephalic. No masses, lesions or tenderness noted  Eyes: Conjunctivae pink, sclera white. PERRL,EOM-I  Ears: External ears normal  Nose/Sinuses: Nares normal. Septum midline. Mucosa normal. No drainage  Oropharynx: Oropharynx clear with no exudate seen  Neck: Supple. No jugular venous distension, lymphadenopathy or thyromegaly Trachea midline  Lungs: Clear to auscultation bilaterally. No rhonchi, crackles or wheezes  Heart: S1 S2  Regular rate and rhythm. No rub, murmur or gallop  Abdomen: Soft, equivocal tenderness right upper quadrant to palpation. BS normal. No masses, organomegaly; no rebound or guarding  Extremities: No edema, Peripheral pulses palpable  Musculoskeletal: Muscular strength appears intact. Neuro:  No focal motor defects ; II-XII grossly intact . RIVERA equally      ASSESSMENT:   Principal Problem:    Right upper quadrant abdominal pain  Active Problems:    Diverticulosis  Resolved Problems:    * No resolved hospital problems. 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 He admits he has become quite sedentary at this time. Possibility of patient being discharged today med reconciliation completed--  Prescriptions written  Follow-up Dr. Gretta Rodrigues 1 week        Discussed with patient and spouse and nursing.       Kaleedusty Sofia Anthony  DO     11:58 AM     2/7/2020    TIME > 35 MINUTES    >  50 %  OF  TIME  DISCUSSION     Active Hospital Problems    Diagnosis    Diverticulosis [K57.90]    Right upper quadrant abdominal pain [R10.11]                 ------------  INFORMATION  -----------      DIET:Diet NPO, After Midnight        Allergies   Allergen Reactions    Iodine Rash         MEDICATION SIDE EFFECTS:none       SCHEDULED MEDS:                                 Current Facility-Administered Medications   Medication Dose Route Frequency Provider Last Rate Last Dose    0.9 % sodium chloride infusion   Intravenous Continuous Oz Anthony  mL/hr at 20      enoxaparin (LOVENOX) injection 40 mg  40 mg Subcutaneous Daily Oz Anthony DO        HYDROcodone-acetaminophen (NORCO) 5-325 MG per tablet 1 tablet  1 tablet Oral Q4H PRN Brady Anthony DO   1 tablet at 20    pantoprazole (PROTONIX) tablet 40 mg  40 mg Oral QAM AC Oz Anthony DO        acetaminophen (TYLENOL) tablet 650 mg  650 mg Oral Q4H PRN Brady Anthony DO        senna (SENOKOT) tablet 8.6 mg  1 tablet Oral Nightly Irving Suarez MD   8.6 mg at 20    ketorolac (TORADOL) injection 15 mg  15 mg Intravenous Q6H PRN Brady Anthony DO   15 mg at 20 05    docusate sodium (COLACE) capsule 200 mg  200 mg Oral BID Brady Anthony DO   200 mg at 20     Scheduled Meds:   enoxaparin  40 mg Subcutaneous Daily    pantoprazole  40 mg Oral QAM AC    senna  1 tablet Oral Nightly    docusate sodium  200 mg Oral BID       Continuous Infusions:   sodium chloride 100 mL/hr at 20         Data:   Temperature:  Current - Temp: 97.7 °F (36.5 °C);  Max - Temp  Av.6 °F (36.4 °C)  Min: 97.5 °F (36.4 °C)  Max: 97.7 °F (36.5 °C)    Respiratory Rate : Resp  Av  Min: 14  Max: 18    Pulse Range: Pulse  Av  Min: 65  Max: 71    Blood Presuure Range:  Systolic (07UEU), GT , Min:99 , IPQ:798   ; Diastolic (42YNX), JTO:25, Min:55, Max:83      Pulse ox Range: SpO2  Av.5 %  Min: 92 %  Max: 96 %    Patient Vitals for the past 8 hrs:   BP Temp Temp src Pulse Resp SpO2   20 0900 120/83 97.7 °F (36.5 °C) Oral 65 18 96 %         Intake/Output Summary (Last 24 hours) at 2020 1158  Last data filed at 2020 0651  Gross per 24 hour   Intake 1547 ml   Output 375 ml   Net 1172 ml       I/O last 3 completed shifts: In: 3460 [I.V.:1547]  Out: 375 [Urine:375]    No intake/output data recorded.     Wt Readings from Last 3 Encounters:   20 235 lb 9.6 oz (106.9 kg)   18 225 lb (102.1 kg)       Labs:   CBC:   Lab Results   Component Value Date    WBC 8.0 2020    RBC 4.61 2020    HGB 13.4 2020    HCT 41.6 2020    MCV 90.2 2020    MCH 29.1 2020    MCHC 32.2 2020    RDW 12.9 2020     2020    MPV 10.0 2020     CBC with Differential:    Lab Results   Component Value Date    WBC 8.0 2020    RBC 4.61 2020    HGB 13.4 2020    HCT 41.6 2020     2020    MCV 90.2 2020    MCH 29.1 2020    MCHC 32.2 2020    RDW 12.9 2020    LYMPHOPCT 32.7 2020    MONOPCT 7.6 2020    BASOPCT 0.6 2020    MONOSABS 0.61 2020    LYMPHSABS 2.63 2020    EOSABS 0.43 2020    BASOSABS 0.05 2020     Hemoglobin/Hematocrit:    Lab Results   Component Value Date    HGB 13.4 2020    HCT 41.6 2020     CMP:    Lab Results   Component Value Date     2020    K 4.1 2020     2020    CO2 24 2020    BUN 20 2020    CREATININE 1.0 2020    GFRAA >60 2020    LABGLOM >60 2020    GLUCOSE 91 2020    PROT 6.2 2020    LABALBU 3.6 2020    CALCIUM 8.3 2020    BILITOT 0.5 2020    ALKPHOS 49 2020    AST 22 2020    ALT 26 2020     BMP:    Lab Results   Component Value Date     2020    K 4.1 2020     2020 41.6    198   MCV 89.4 90.2   MCH 30.4 29.1   MCHC 34.0 32.2   RDW 13.2 12.9   LYMPHOPCT  --  32.7   MONOPCT  --  7.6   BASOPCT  --  0.6   MONOSABS  --  0.61   LYMPHSABS  --  2.63   EOSABS  --  0.43   BASOSABS  --  0.05          H & H :  Recent Labs     02/06/20  0816 02/07/20  0350   HGB 15.2 13.4       TSH:  Recent Labs     02/07/20  0350   TSH 3.020       GLUCOSE:No results for input(s): POCGLU in the last 72 hours. CMP:  Recent Labs     02/06/20  1100 02/07/20  0350    135   K 4.1 4.1    104   CO2 25 24   BUN 18 20   CREATININE 1.1 1.0   GFRAA >60 >60   LABGLOM >60 >60   GLUCOSE 119* 91   PROT 6.9 6.2*   LABALBU 4.1 3.6   CALCIUM 9.1 8.3*   BILITOT 0.3 0.5   ALKPHOS 63 49   AST 20 22   ALT 25 26        BNP:No results found for: BNP    PROTIME/INR:  Recent Labs     02/06/20  1337   PROTIME 9.8   INR 0.9       CRP: No results for input(s): CRP in the last 72 hours. ESR:  Recent Labs     02/06/20  1337   SEDRATE 4       LIPASE , AMYLASE:  Lab Results   Component Value Date    LIPASE 27 02/06/2020      No results found for: AMYLASE    ABGs: No results found for: PH, PO2, PCO2    CARDIAC:   Recent Labs     02/06/20  1337   TROPONINI <0.01       Lipid Profile:   Lab Results   Component Value Date    TRIG 185 02/07/2020    HDL 43 02/07/2020    LDLCALC 124 02/07/2020    CHOL 204 02/07/2020        Lab Results   Component Value Date    LABA1C 6.4 (H) 02/06/2020            RADIOLOGY: REVIEWED AVAILABLE REPORT  NM Hepatobiliary   Final Result   Patent cystic and common bile ducts. US ABDOMEN LIMITED   Final Result   Unremarkable biliary tract. The liver appears mildly   hyperechoic which is a nonspecific finding frequently related to fatty   infiltration. XR CHEST STANDARD (2 VW)   Final Result   No acute cardiopulmonary findings. CT ABDOMEN PELVIS WO CONTRAST   Final Result   Normal appendix. Colonic diverticulosis.  Small fat-containing right   inguinal hernia.                                [address]   11:58 AM     2/7/2020      Voice recognition software used for dictation

## 2020-02-07 NOTE — ANESTHESIA PRE PROCEDURE
04/29/18 225 lb (102.1 kg)     Body mass index is 33.33 kg/m². CBC:   Lab Results   Component Value Date    WBC 8.0 02/07/2020    RBC 4.61 02/07/2020    HGB 13.4 02/07/2020    HCT 41.6 02/07/2020    MCV 90.2 02/07/2020    RDW 12.9 02/07/2020     02/07/2020       CMP:   Lab Results   Component Value Date     02/07/2020    K 4.1 02/07/2020     02/07/2020    CO2 24 02/07/2020    BUN 20 02/07/2020    CREATININE 1.0 02/07/2020    GFRAA >60 02/07/2020    LABGLOM >60 02/07/2020    GLUCOSE 91 02/07/2020    PROT 6.2 02/07/2020    CALCIUM 8.3 02/07/2020    BILITOT 0.5 02/07/2020    ALKPHOS 49 02/07/2020    AST 22 02/07/2020    ALT 26 02/07/2020       POC Tests: No results for input(s): POCGLU, POCNA, POCK, POCCL, POCBUN, POCHEMO, POCHCT in the last 72 hours. Coags:   Lab Results   Component Value Date    PROTIME 9.8 02/06/2020    INR 0.9 02/06/2020       HCG (If Applicable): No results found for: PREGTESTUR, PREGSERUM, HCG, HCGQUANT     ABGs: No results found for: PHART, PO2ART, YXG1RDW, LDU4QVK, BEART, D5PFUSBH     Type & Screen (If Applicable):  No results found for: LABABO, [address]    Anesthesia Evaluation  Patient summary reviewed and Nursing notes reviewed no history of anesthetic complications:   Airway: Mallampati: III  TM distance: >3 FB   Neck ROM: full  Mouth opening: > = 3 FB Dental:          Pulmonary: breath sounds clear to auscultation  (+) current smoker                           Cardiovascular:Negative CV ROS  Exercise tolerance: good (>4 METS),           Rhythm: regular  Rate: normal           Beta Blocker:  Not on Beta Blocker         Neuro/Psych:   Negative Neuro/Psych ROS              GI/Hepatic/Renal:            ROS comment: abd pain  diverticulosis. Endo/Other: Negative Endo/Other ROS                    Abdominal:           Vascular: negative vascular ROS. Anesthesia Plan      MAC     ASA 2       Induction: intravenous.       Anesthetic plan and risks discussed with patient.                       Román Aguilar MD   2/7/2020

## 2020-02-07 NOTE — OP NOTE
Endoscopic  Procedure Note    Procedure: Esophagogastroduodenoscopy with biopsy    Pre-operative Diagnosis: Abdominal pain     Post-operative Diagnosis:  Reflux disease with possible Jimenez's      Mild gastritis and duodenitis    EBL: None    Procedure: The patient was brought to the endoscopy suite and placed on the table in the left lateral decubitus position. The patient received anesthesia per the department of anesthesiology. A bite-block was placed. The endoscope was passed without difficulty through the lumen of the esophagus, stomach and duodenum. The endoscope was retrieved. The patient had mild duodenitis. Biopsies were obtained. Upon reentering to the stomach, the patient a mild gastritis. Biopsies were obtained. The retroflexed view did not reveal a hiatal hernia. The GE junction was marked at 40 cm from the incisors with changes consistent with reflux disease. Under narrowband imaging, the distal esophagus was biopsied. The remainder the examination was uneventful. Sedation: MAC        Complications: None            Disposition: PACU - hemodynamically stable.            Condition: stable        Tiffanie Vergara 2/7/2020 3:10 PM

## 2020-02-08 LAB — URINE CULTURE, ROUTINE: NORMAL

## 2020-02-08 NOTE — ANESTHESIA POSTPROCEDURE EVALUATION
Department of Anesthesiology  Postprocedure Note    Patient: Moises Lundy  MRN: [id number]  YOB: 1965  Date of evaluation: 2/8/2020  Time:  6:47 AM     Procedure Summary     Date:  02/07/20 Room / Location:  [address] / [facility]    Anesthesia Start:  1882 Anesthesia Stop:  6299    Procedure:  EGD BIOPSY (N/A ) Diagnosis:  (/)    Surgeon:  Tiffanie Vergara MD Responsible Provider:  Blanche Campo MD    Anesthesia Type:  MAC ASA Status:  2          Anesthesia Type: MAC    Roro Phase I:      Roro Phase II: Roro Score: 10    Last vitals: Reviewed and per EMR flowsheets.        Anesthesia Post Evaluation    Patient location during evaluation: PACU  Patient participation: complete - patient participated  Level of consciousness: awake and alert  Airway patency: patent  Nausea & Vomiting: no vomiting and no nausea  Complications: no  Cardiovascular status: blood pressure returned to baseline  Respiratory status: acceptable  Hydration status: euvolemic

## 2020-02-09 LAB
AFP-TUMOR MARKER: 2 NG/ML (ref 0–9)
CA 19-9: 11 U/ML (ref 0–37)

## 2020-02-11 LAB
BLOOD CULTURE, ROUTINE: NORMAL
CULTURE, BLOOD 2: NORMAL

## (undated) DEVICE — GRADUATE TRIANG MEASURE 1000ML BLK PRNT

## (undated) DEVICE — SPONGE GZ W4XL4IN RAYON POLY FILL CVR W/ NONWOVEN FAB

## (undated) DEVICE — FORCEPS BX OVL CUP FEN DISPOSABLE CAP L 160CM RAD JAW 4

## (undated) DEVICE — BLOCK BITE 60FR RUBBER ADLT DENTAL